# Patient Record
Sex: MALE | Race: WHITE | NOT HISPANIC OR LATINO | URBAN - METROPOLITAN AREA
[De-identification: names, ages, dates, MRNs, and addresses within clinical notes are randomized per-mention and may not be internally consistent; named-entity substitution may affect disease eponyms.]

---

## 2023-10-29 PROBLEM — R76.12 POSITIVE QUANTIFERON-TB GOLD TEST: Status: ACTIVE | Noted: 2023-10-29

## 2023-10-29 PROBLEM — J45.909 ASTHMA (HHS-HCC): Status: ACTIVE | Noted: 2023-10-29

## 2023-10-29 PROBLEM — E66.9 OBESITY, UNSPECIFIED: Status: ACTIVE | Noted: 2023-10-29

## 2023-10-29 PROBLEM — K21.9 GASTROESOPHAGEAL REFLUX DISEASE WITHOUT ESOPHAGITIS: Status: ACTIVE | Noted: 2023-10-29

## 2023-10-29 PROBLEM — F41.9 ANXIETY: Status: ACTIVE | Noted: 2023-10-29

## 2024-06-18 ENCOUNTER — APPOINTMENT (OUTPATIENT)
Dept: PRIMARY CARE | Facility: CLINIC | Age: 24
End: 2024-06-18
Payer: COMMERCIAL

## 2024-06-18 VITALS
SYSTOLIC BLOOD PRESSURE: 136 MMHG | DIASTOLIC BLOOD PRESSURE: 64 MMHG | WEIGHT: 192 LBS | OXYGEN SATURATION: 99 % | HEART RATE: 86 BPM | BODY MASS INDEX: 27.49 KG/M2 | HEIGHT: 70 IN

## 2024-06-18 DIAGNOSIS — J45.20 MILD INTERMITTENT ASTHMA, UNSPECIFIED WHETHER COMPLICATED (HHS-HCC): ICD-10-CM

## 2024-06-18 DIAGNOSIS — K21.9 GASTROESOPHAGEAL REFLUX DISEASE WITHOUT ESOPHAGITIS: ICD-10-CM

## 2024-06-18 DIAGNOSIS — Z00.00 WELL ADULT EXAM: ICD-10-CM

## 2024-06-18 DIAGNOSIS — F32.A DEPRESSION, UNSPECIFIED DEPRESSION TYPE: Primary | ICD-10-CM

## 2024-06-18 PROCEDURE — 99385 PREV VISIT NEW AGE 18-39: CPT | Performed by: FAMILY MEDICINE

## 2024-06-18 PROCEDURE — 1036F TOBACCO NON-USER: CPT | Performed by: FAMILY MEDICINE

## 2024-06-18 PROCEDURE — 3008F BODY MASS INDEX DOCD: CPT | Performed by: FAMILY MEDICINE

## 2024-06-18 RX ORDER — SERTRALINE HYDROCHLORIDE 50 MG/1
TABLET, FILM COATED ORAL
Qty: 30 TABLET | Refills: 1 | Status: SHIPPED | OUTPATIENT
Start: 2024-06-18

## 2024-06-18 RX ORDER — CALC/MAG/B COMPLEX/D3/HERB 61
15 TABLET ORAL
COMMUNITY

## 2024-06-18 ASSESSMENT — PATIENT HEALTH QUESTIONNAIRE - PHQ9
2. FEELING DOWN, DEPRESSED OR HOPELESS: NOT AT ALL
SUM OF ALL RESPONSES TO PHQ9 QUESTIONS 1 AND 2: 0
1. LITTLE INTEREST OR PLEASURE IN DOING THINGS: NOT AT ALL

## 2024-06-18 ASSESSMENT — PAIN SCALES - GENERAL: PAINLEVEL: 6

## 2024-07-23 ENCOUNTER — APPOINTMENT (OUTPATIENT)
Dept: PRIMARY CARE | Facility: CLINIC | Age: 24
End: 2024-07-23
Payer: COMMERCIAL

## 2024-07-23 VITALS
SYSTOLIC BLOOD PRESSURE: 140 MMHG | HEIGHT: 70 IN | WEIGHT: 188 LBS | HEART RATE: 52 BPM | OXYGEN SATURATION: 99 % | BODY MASS INDEX: 26.92 KG/M2 | DIASTOLIC BLOOD PRESSURE: 78 MMHG

## 2024-07-23 DIAGNOSIS — K21.9 GASTROESOPHAGEAL REFLUX DISEASE WITHOUT ESOPHAGITIS: Primary | ICD-10-CM

## 2024-07-23 DIAGNOSIS — F32.A DEPRESSION, UNSPECIFIED DEPRESSION TYPE: ICD-10-CM

## 2024-07-23 PROCEDURE — 1036F TOBACCO NON-USER: CPT | Performed by: FAMILY MEDICINE

## 2024-07-23 PROCEDURE — 3008F BODY MASS INDEX DOCD: CPT | Performed by: FAMILY MEDICINE

## 2024-07-23 PROCEDURE — 99213 OFFICE O/P EST LOW 20 MIN: CPT | Performed by: FAMILY MEDICINE

## 2024-07-23 RX ORDER — SERTRALINE HYDROCHLORIDE 50 MG/1
50 TABLET, FILM COATED ORAL DAILY
Qty: 90 TABLET | Refills: 1 | Status: SHIPPED | OUTPATIENT
Start: 2024-07-23 | End: 2025-01-19

## 2024-07-23 RX ORDER — FAMOTIDINE 20 MG/1
20 TABLET, FILM COATED ORAL 2 TIMES DAILY
COMMUNITY

## 2024-07-23 ASSESSMENT — PAIN SCALES - GENERAL: PAINLEVEL: 0-NO PAIN

## 2024-07-23 NOTE — PROGRESS NOTES
"Subjective   Patient ID: Johnson Tejada is a 23 y.o. male who presents for Depression (First follow  up  after starting Zoloft one month ago/Is currently taking Zoloft 50mg one tablet daily now./No refill needed today per patient.).    HPI here for follow-up for multiple issues.  Last office visit patient was placed on sertraline.  He returns today stating that his mood is significantly improved and feels back to his old self.  He is on 50 mg daily and feels quite stable at this time.  Patient has had some problems with reflux.  He saw an ENT for this and has been placed on some Pepcid in addition to the lansoprazole that he had been using.  He is much improved.  Review of Systems  Constitutional: Patient is negative for fever, fatigue, weight change.  HEENT: Patient is negative for change in vision, hearing, swallow.  Cardio: Patient is negative for chest pain, lower extremity edema.  Pulmonary: Patient is negative for cough, shortness of breath.  Gastro: Patient is positive but improved for reflux.  Objective   /78 (BP Location: Left arm, Patient Position: Sitting, BP Cuff Size: Adult)   Pulse 52   Ht 1.765 m (5' 9.5\")   Wt 85.3 kg (188 lb)   SpO2 99%   BMI 27.36 kg/m²     Physical Exam  General: Awake and alert no apparent distress.  HEENT: Moist oral mucosa no cervical lymphadenopathy.  Cardio: Heart S1-S2 no murmur rub or gallop.  Pulmonary: Lungs clear to auscultation bilaterally.  Assessment/Plan   Problem List Items Addressed This Visit             ICD-10-CM    Gastroesophageal reflux disease without esophagitis - Primary improved.  Management by specialist.  Patient will be seeing GI in the near future.  Continue on lansoprazole and Pepcid. K21.9     Other Visit Diagnoses         Codes    Depression, unspecified depression type    improved.  Continue on sertraline 50 mg daily.  Patient will follow-up as needed. F32.A    Relevant Medications    sertraline (Zoloft) 50 mg tablet               "

## 2024-08-28 NOTE — PROGRESS NOTES
Subjective   Patient ID: Johnson Tejada is a 23 y.o. male who presents for Annual Exam (/Tdap 6/2023/EKG 12/2023 in ED in [place]).    HPI NP here for CPE.  Patient is a nursing student at [employer].  Patient has a past history significant for reflux.  He is on lansoprazole 15 mg daily.  It is significantly helped his upset stomach but he still has a scratchy throat.  Patient has a history of anxiety.  He was seen by a specialist who had him on hydroxyzine, escitalopram, aripiprazole.  He disliked all of the medications and discontinued them he still feels anxious and nervous.  He would like to try something else.  Patient has a history of asthma.  He is currently stable.  Patient is up-to-date on his immunizations.  Patient does not use tobacco or alcohol.    Review of Systems  Constitutional Symptoms:    He is negative for fever, night sweats, hot flashes, weight loss, Weight loss due to diet, weight gain due to diet, unexpected weight gain, loss of appetite, increased appetite, headaches, fatigue, abnormal activity level, Sleep Disturbance, Recent Illness.   Eyes:  He is negative for blindness, loss and blurring of vision, double vision, swelling, redness, pruritus, eye pain, discharge, dryness of eyes.   Ear, Nose, Mouth, Throat:  He is negative for hearing loss, wearing hearing aids, tinnitus, ear pain, ear drainage, dizziness, allergies, nasal congestion, rhinorrhea, nasal obstruction, post nasal drip, nose bleeds, teeth problems, wearing dentures, mouth sores, gum disease, dysphagia, hoarseness, sore throat, tinnitus, sleep apnea.   Cardiovascular:  He is negative for chest pain/pressure, radiation of pain, palpitations, shortness of breath, dyspnea on exertion, orthopnea, syncope, diaphoresis, cyanosis, edema.   Respiratory:  He is negative for shortness of breath, dyspnea on exertion, coughing, sputum, hemoptysis, wheezing, snoring.   Breast:  He is negative for masses, nipple discharge,  "gynecomastia.   Gastrointestinal:  He is negative for anorexia, indigestion, increased belching, food intolerance, use of antacids, nausea, hematemesis, jaundice, abdominal pain, change in bowel habits, diarrhea, constipation, abnormal stools, hematochezia, melena, blood in stool, increased flatus, hemorrhoids.   Male Genitourinary:  He is negative for erectile dysfunction, frequency, nocturia, hesitancy, dribbling, Incontinence, dysuria, hematuria, Swelling of penis, testicular pain or swelling, Hematospermia, urethral discharge.   Musculoskeletal:  He is negative for joint pain, joint swelling, joint warmth, joint redness, myalgias, cramps, nocturnal muscle cramps, weakness, stiffness, limitation of motion.   Integumentary:  He is negative for change in mole, skin trouble or rash, itching, dryness, loss of hair, dandruff, hirsutism.   Neurological:  He is negative for headache, speech difficulty, numbness, tingling, weakness, paralysis, tremors, dizziness, syncope, balance problems, memory loss, .   Psychiatric:   He is negative for depression, moodiness, anhedonia, change in sleep pattern, disturbing thoughts or feelings, change in libido, suicidal thoughts or attempts, anxiety, panic attacks, obsessive thoughts, compulsions, hyperactivity.   Endocrine:  He is negative for weight gain, heat or cold intolerance, excessive sweating, polydipsia.   Hematologic/Lymphatic:  He is negative for bruising, abnormal bleeding, bleeding gums, nose bleeds, swollen glands.  Objective   /64 (BP Location: Left arm, Patient Position: Sitting, BP Cuff Size: Adult)   Pulse 86   Ht 1.765 m (5' 9.5\")   Wt 87.1 kg (192 lb)   SpO2 99%   BMI 27.95 kg/m²     Physical Exam  General Appearance: Pt is in no acute distress. He is well nourished, well developed. The patient is awake and alert and appears stated age.  patient did not undress for examination  Head:   Pt's hair pattern is normal for patients age and The scalp is normal " . The skull is normocephalic, atraumatic. The face is unremarkable with no facial droop.  Eyes: PERRLA, EOMI, no scleral icterus.  Normal position and alignment. Eyebrows are normal.  Ears, Nose, Mouth, Throat:   EARS: External bilateral ears reveal normal helix, tragus and ear lobe.  Both canals are normal.  Tympanic membranes are pearly gray, normal landmarks, good light reflex.   MOUTH: Lips are normal with no lesion. Oral mucosa is moist.  Hard and soft palates are normal. Teeth are in good repair. Tongue reveals is normal. Tonsils are normal with no lesion. Uvula is normal. Posterior pharynx without lesions.  Neck: Inspection of the neck reveals no enlarged nodes, no masses, no JVD, euthyroid and Palpation shows normal pulsation, no adenopathy, or mass .   Inspection reveals normal thyroid gland. Palpation shows normal thyroid gland. with No carotid bruit present.   Anterior cervical lymph node chains are unremarkable. Posterior chains are unremarkable.  Chest: Chest is symmetric. Lungs are clear to auscultation and percussion, no respiratory distress. Breathing is normal.  Cardiovascular: Heart is RRR, S1, S2. No murmurs, rubs or gallops. PMI is normal in left IC space midclavicular line. DP pulses are plus 2/4. PT pulses are +2/4 Extremities: No edema, clubbing or cyanosis.  Abdomen: Abdomen is soft, NT, ND. BS + 4 quadrants. No hepatosplenomegaly.  Genitourinary:  no examination  Lymph Nodes: Palpation of the cervical area are within normal limit. Palpation of the axillary area are within normal limit. Palpation of the inguinal area are within normal limit.  Musculoskeletal: Gait is normal. Extremities: No deformity. No cyanosis, clubbing or edema and Range of motion normal .  Both feet have no evidence of lesions and nails are in good repair.  Right hallux with mild crepitus. Right middle finger PIP with swelling and no crepitus. Motor examination reveals normal tone and strength.  Skin: Patient has a small  1.5 cm lipoma in the area of the's of the sternum.  Otherwise no rashes noted to body.  Patient does have a deformed left thumbnail secondary to trauma.  Neurological: Intact and non-focal. Cranial nerves II - XII are grossly intact. Motor exams reveals normal tone and strength , Sensation: normal to touch, position and vibration. DTR: are +2/4 and symmetric at the level of the patella.  Psychiatric:  patient speaks constantly. Patient has appropriate judgement. Patient has good insight. Proper orientation to person, place and time. Recent memory is intact. Remote memory is intact. Patient's mood is normal with good eye contact. Affect is appropriate.  Assessment/Plan   Problem List Items Addressed This Visit             ICD-10-CM    Gastroesophageal reflux disease without esophagitis K21.9    Asthma ([facility])   needs better control.  Increase lansoprazole from 50 mg daily to twice daily.  Patient will follow-up in 1 month. J45.909     Other Visit Diagnoses         Codes    Depression, unspecified depression type   better control.  Begin sertraline.  Patient will follow-up in 1 month.-  Primary F32.A    Relevant Medications    sertraline (Zoloft) 50 mg tablet    Other Relevant Orders    Follow Up In Primary Care - Established    Well adult exam    normal exam. Z00.00                [Parent] : parent [FreeTextEntry1] : Celiac disease, elevated alkaline phosphatase

## 2024-10-14 ENCOUNTER — APPOINTMENT (OUTPATIENT)
Dept: OTOLARYNGOLOGY | Facility: CLINIC | Age: 24
End: 2024-10-14
Payer: COMMERCIAL

## 2024-10-14 VITALS — HEIGHT: 70 IN | TEMPERATURE: 97.6 F | BODY MASS INDEX: 26.48 KG/M2 | WEIGHT: 185 LBS

## 2024-10-14 DIAGNOSIS — B99.9 INFECTION: Primary | ICD-10-CM

## 2024-10-14 DIAGNOSIS — J31.2 CHRONIC PHARYNGITIS: ICD-10-CM

## 2024-10-14 DIAGNOSIS — R07.0 THROAT PAIN: ICD-10-CM

## 2024-10-14 PROCEDURE — 1036F TOBACCO NON-USER: CPT | Performed by: OTOLARYNGOLOGY

## 2024-10-14 PROCEDURE — 31575 DIAGNOSTIC LARYNGOSCOPY: CPT | Performed by: OTOLARYNGOLOGY

## 2024-10-14 PROCEDURE — 3008F BODY MASS INDEX DOCD: CPT | Performed by: OTOLARYNGOLOGY

## 2024-10-14 PROCEDURE — 99203 OFFICE O/P NEW LOW 30 MIN: CPT | Performed by: OTOLARYNGOLOGY

## 2024-10-14 RX ORDER — DOXYCYCLINE 100 MG/1
100 CAPSULE ORAL 2 TIMES DAILY
Qty: 42 CAPSULE | Refills: 0 | Status: SHIPPED | OUTPATIENT
Start: 2024-10-14 | End: 2024-11-04

## 2024-10-14 NOTE — PROGRESS NOTES
"HPI  Johnson Tejada is a 24 y.o. male here with frequent irritation of the throat.  He has some red patches of the posterior pharyngeal wall.  He has undergone evaluation at the [facility] and started on antiacid.  He is taking Prevacid twice daily and Pepcid twice daily.  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  He does not have dysphagia or trouble breathing.  Voice is strong.  His symptoms been present for months.  He is not a smoker.      Past Medical History:   Diagnosis Date    GERD (gastroesophageal reflux disease)             Medications:     Current Outpatient Medications:     famotidine (Pepcid) 20 mg tablet, Take 1 tablet (20 mg) by mouth 2 times a day. PER ENT, Disp: , Rfl:     lansoprazole (Prevacid) 15 mg DR capsule, Take 1 capsule (15 mg) by mouth 2 times a day. Do not crush or chew.  PER  ENT, Disp: , Rfl:     sertraline (Zoloft) 50 mg tablet, Take 1 tablet (50 mg) by mouth once daily., Disp: 90 tablet, Rfl: 1     Allergies:  Allergies   Allergen Reactions    Ibuprofen Swelling        Physical Exam:  Last Recorded Vitals  Temperature 36.4 °C (97.6 °F), height 1.765 m (5' 9.5\"), weight 83.9 kg (185 lb).  General:     General appearance: Well-developed, well-nourished in no acute distress.       Voice:  normal       Head/face: Normal appearance; nontender to palpation     Facial nerve function: Normal and symmetric bilaterally.    Oral/oropharynx:     Oral vestibule: Normal labial and gingival mucosa     Tongue/floor of mouth: Normal without lesion     Oropharynx: Clear.  No lesions present of the hard/soft palate, posterior pharynx.  He has some marked erythematous islands of the posterior pharyngeal wall.  He has strawberry appearing lingual tonsil tissue.    Neck:     Neck: Normal appearance, trachea midline     Salivary glands: Normal to palpation bilaterally     Lymph nodes: No cervical " lymphadenopathy to palpation     Thyroid: No thyromegaly.  No palpable nodules     Range of motion: Normal    Neurological:     Cortical functions: Alert and oriented x3, appropriate affect       Larynx/hypopharynx:     Laryngeal findings: Mirror exam inadequate or limited secondary to enlarged base of tongue and/or excessive gagging    Ear:     Ear canal: Normal bilaterally     Tympanic membrane: Intact and mobile bilaterally     Pinna: Normal bilaterally     Hearing:  Gross hearing assessment normal by voice    Nose:     Visualized using: Anterior rhinoscopy     Nasopharynx: Inadequate mirror exam secondary to gag, anatomy.       Nasal dorsum: Nontraumatic midline appearance     Septum: Midline     Inferior turbinates: Normally sized     Mucosa: Bilateral, pink, normal appearing       ASSESSMENT/PLAN:  Question of the possibility of bacterial component.  He does have evidence of inflammation.  He has been on quite a dose of antiacid and continues to have symptoms.  I recommended a trial of doxycycline for a few weeks to see if this offers any improvement.  I recommended that he follow through with his GI consult for their opinion.  He will call us in a few weeks with an update and we may consider alternative treatments if he has not improved        Kvng Villafuerte MD

## 2024-12-02 DIAGNOSIS — F32.A DEPRESSION, UNSPECIFIED DEPRESSION TYPE: ICD-10-CM

## 2024-12-03 RX ORDER — SERTRALINE HYDROCHLORIDE 50 MG/1
50 TABLET, FILM COATED ORAL DAILY
Qty: 90 TABLET | Refills: 1 | Status: SHIPPED | OUTPATIENT
Start: 2024-12-03

## 2025-01-08 ENCOUNTER — APPOINTMENT (OUTPATIENT)
Dept: OTOLARYNGOLOGY | Facility: CLINIC | Age: 25
End: 2025-01-08
Payer: COMMERCIAL

## 2025-01-08 VITALS — HEIGHT: 70 IN | TEMPERATURE: 97.5 F | WEIGHT: 200 LBS | BODY MASS INDEX: 28.63 KG/M2

## 2025-01-08 DIAGNOSIS — R07.0 THROAT PAIN: ICD-10-CM

## 2025-01-08 DIAGNOSIS — K21.9 LARYNGOPHARYNGEAL REFLUX: ICD-10-CM

## 2025-01-08 DIAGNOSIS — J31.2 CHRONIC PHARYNGITIS: Primary | ICD-10-CM

## 2025-01-08 PROCEDURE — 31575 DIAGNOSTIC LARYNGOSCOPY: CPT | Performed by: OTOLARYNGOLOGY

## 2025-01-08 PROCEDURE — 99213 OFFICE O/P EST LOW 20 MIN: CPT | Performed by: OTOLARYNGOLOGY

## 2025-01-08 PROCEDURE — 3008F BODY MASS INDEX DOCD: CPT | Performed by: OTOLARYNGOLOGY

## 2025-01-08 NOTE — PROGRESS NOTES
"HPI  Johnson Tejada is a 24 y.o. male follow-up laryngopharyngeal reflux.  He is taking Pepcid and Prevacid once daily.  He feels less pain.  He is scheduled for EGD consultation.  He continues to have some redness of the pharynx and has some concerns about this appearance.    Prior history: Here with frequent irritation of the throat.  He has some red patches of the posterior pharyngeal wall.  He has undergone evaluation at the [facility] and started on antiacid.  He is taking Prevacid twice daily and Pepcid twice daily.  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  He does not have dysphagia or trouble breathing.  Voice is strong.  His symptoms been present for months.  He is not a smoker.      Past Medical History:   Diagnosis Date    GERD (gastroesophageal reflux disease)             Medications:     Current Outpatient Medications:     famotidine (Pepcid) 20 mg tablet, Take 1 tablet (20 mg) by mouth 2 times a day. PER ENT, Disp: , Rfl:     lansoprazole (Prevacid) 15 mg DR capsule, Take 1 capsule (15 mg) by mouth 2 times a day. Do not crush or chew.  PER  ENT, Disp: , Rfl:     sertraline (Zoloft) 50 mg tablet, TAKE 1 TABLET(50 MG) BY MOUTH DAILY, Disp: 90 tablet, Rfl: 1     Allergies:  Allergies   Allergen Reactions    Ibuprofen Swelling        Physical Exam:  Last Recorded Vitals  Temperature 36.4 °C (97.5 °F), height 1.765 m (5' 9.5\"), weight 90.7 kg (200 lb).  General:     General appearance: Well-developed, well-nourished in no acute distress.       Voice:  normal       Head/face: Normal appearance; nontender to palpation     Facial nerve function: Normal and symmetric bilaterally.    Oral/oropharynx:     Oral vestibule: Normal labial and gingival mucosa     Tongue/floor of mouth: Normal without lesion     Oropharynx: Clear.  No lesions present of the hard/soft palate, posterior pharynx.  He has some continued " benign-appearing erythematous islands of the posterior pharyngeal wall.    Neck:     Neck: Normal appearance, trachea midline     Salivary glands: Normal to palpation bilaterally     Lymph nodes: No cervical lymphadenopathy to palpation     Thyroid: No thyromegaly.  No palpable nodules     Range of motion: Normal    Neurological:     Cortical functions: Alert and oriented x3, appropriate affect       Larynx/hypopharynx:     Laryngeal findings: Mirror exam inadequate or limited secondary to enlarged base of tongue and/or excessive gagging.  Flexible laryngoscopy performed secondary to inadequate mirror examination.  Verbal informed consent the flexible laryngoscope was passed through the nasal cavity.  Normal epiglottis, aryepiglottic folds, arytenoids, false vocal cords, true vocal cords.  Normal vocal cord mobility bilaterally was identified.  No mucosal masses or lesions.    Nasopharynx:     Nasopharynx: Normal    Ear:     Ear canal: Normal bilaterally     Tympanic membrane: Intact and mobile bilaterally     Pinna: Normal bilaterally     Hearing:  Gross hearing assessment normal by voice    Nose:     Visualized using: Anterior rhinoscopy     Nasopharynx: Inadequate mirror exam secondary to gag, anatomy.       Nasal dorsum: Nontraumatic midline appearance     Septum: Midline     Inferior turbinates: Normally sized     Mucosa: Bilateral, pink, normal appearing       ASSESSMENT/PLAN:  He has no evidence of infection at this point.  He is feeling mostly better.  I think any residual symptoms are likely to be the result of reflux.  He was reassured he has no suspicious masses or lesions.  I recommended that he follow through with his EGD and call with any new or changed symptoms.        Kvng Villafuerte MD

## 2025-02-05 ENCOUNTER — APPOINTMENT (OUTPATIENT)
Dept: OTOLARYNGOLOGY | Facility: CLINIC | Age: 25
End: 2025-02-05
Payer: COMMERCIAL